# Patient Record
Sex: MALE | Race: OTHER | NOT HISPANIC OR LATINO | URBAN - METROPOLITAN AREA
[De-identification: names, ages, dates, MRNs, and addresses within clinical notes are randomized per-mention and may not be internally consistent; named-entity substitution may affect disease eponyms.]

---

## 2023-09-12 ENCOUNTER — EMERGENCY (EMERGENCY)
Facility: HOSPITAL | Age: 33
LOS: 1 days | Discharge: HOME | End: 2023-09-12
Attending: EMERGENCY MEDICINE
Payer: COMMERCIAL

## 2023-09-12 VITALS
RESPIRATION RATE: 16 BRPM | HEART RATE: 57 BPM | DIASTOLIC BLOOD PRESSURE: 88 MMHG | TEMPERATURE: 98 F | HEIGHT: 70 IN | OXYGEN SATURATION: 99 % | WEIGHT: 194.01 LBS | SYSTOLIC BLOOD PRESSURE: 143 MMHG

## 2023-09-12 PROCEDURE — 99282 EMERGENCY DEPT VISIT SF MDM: CPT

## 2023-09-12 PROCEDURE — 99283 EMERGENCY DEPT VISIT LOW MDM: CPT

## 2023-09-12 NOTE — ED PROVIDER NOTE - CARE PROVIDERS DIRECT ADDRESSES
,DirectAddress_Unknown ,DirectAddress_Unknown,[email or web address]

## 2023-09-12 NOTE — ED PROVIDER NOTE - ADDITIONAL NOTES AND INSTRUCTIONS:
PT was evaluated At [facility] ED and was found to have a condition that warranted time of to rest and heal from WORK/SCHOOL.   Blade Orantes PA-C

## 2023-09-12 NOTE — ED PROVIDER NOTE - NSFOLLOWUPINSTRUCTIONS_ED_ALL_ED_FT
Nasal congestion and rhinorrhea (runny nose) are extremely common problems that commonly occur together but occasionally occur alone.  The most common causes (see table ) are  Upper respiratory infections  Allergic reactions  Dry air may provoke congestion. Acute sinusitis is slightly less common, and a nasal foreign body is unusual (and occurs predominantly in children).  Patients who use topical decongestants for > 3 to 5 days often experience significant rebound congestion when the effects of the decongestants wear off,  causing them to continue using the decongestant in a vicious [place] of persistent, worsening congestion. This situation (rhinitis medicamentosa) may  persist for some time and may be misinterpreted as a continuation of the original problem rather than a consequence of treatment.  History  History of present illness should determine the nature of the discharge (eg, watery, mucoid, purulent, bloody) and whether discharge is chronic or  recurrent. If recurrent, any relation to patient location, season, and exposure to potential triggering allergens (numerous) should be determined. A  unilateral, clear, watery discharge, particularly after head trauma, can indicate a cerebrospinal fluid (CSF) leak. CSF discharge can also occur  spontaneously in women who are in their 40s and have obesity, secondary to idiopathic intracranial hypertension.  Review of systems should seek symptoms of possible causes, including fever and facial pain (sinusitis); watery, itchy eyes (allergies); and sore throat,  malaise, fever, and cough (viral upper respiratory infection [URI]).  Past medical history should seek known allergies and existence of diabetes or immunocompromise. Drug (prescription, over-the-counter, illicit) history  should include asking specifically about topical decongestant use.  Some Causes of Nasal Congestion and Rhinorrhea  Etiology of Nasal Congestion and Rhinorrhea

## 2023-09-12 NOTE — ED PROVIDER NOTE - PROVIDER TOKENS
PROVIDER:[TOKEN:[[id number]]] PROVIDER:[TOKEN:[[id number]]],PROVIDER:[TOKEN:[[id number]]]

## 2023-09-12 NOTE — ED PROVIDER NOTE - NEUROLOGICAL, MLM
Heating PAD 20 MIN AT A TIME 3-4 TIMES PER DAY.  IBUPROFEN AS NEEDED. MODERATE PHYSICAL ACTIVITY AS TOLERATED.   
Alert and oriented, no focal deficits, no motor or sensory deficits.

## 2023-09-12 NOTE — ED PROVIDER NOTE - CLINICAL SUMMARY MEDICAL DECISION MAKING FREE TEXT BOX
PT with no SPMHx presents to the ED with complaint of clear nasal discharge that occurred JPTA. Pt states that he had a sudden onset of painless nasal discharge. Pt states that liquid that came out of his nose was clean and made a puddle on the floor. Pt states that is started spontaneously and ended spontaneously. Pt admits that he had a salt taste in the back of his mouth when this happened. Pt states that he had no HA, dizziness, trama, loss of vision or hearing with incidents. Pt dines fever, chills, trama, change in smell. On exam Pt with no acute findings. Pt states that he came to the ED due to being worried that  he had leaked CSF. Pt educated that he likely had mucus leaking not CSF due to it being spontaneous no secondary symptoms, no change in neuro status. Pt to be dc home with supportive care, follow up to ENT, educated about when to return to the ED if needed. PT verbalizes that he understands all instructions and results. Pt informed that ED is open and available 24/7 365 days a yr, encouraged to return to the ED if they have any change in condition, or feel the need for revaluation.

## 2023-09-12 NOTE — ED PROVIDER NOTE - PATIENT PORTAL LINK FT
You can access the FollowMyHealth Patient Portal offered by [facility] by registering at the following website: [email or web address]. By joining [facility]’s FollowMyHealth portal, you will also be able to view your health information using other applications (apps) compatible with our system.

## 2023-09-12 NOTE — ED ADULT TRIAGE NOTE - CHIEF COMPLAINT QUOTE
I had clear fluid coming from [place] nose on the train this [place]. denies any fever, N/V/D, injury or fall

## 2023-09-12 NOTE — ED PROVIDER NOTE - ATTENDING APP SHARED VISIT CONTRIBUTION OF CARE
pt with runny nose , clear. Worried that he is leaking csf after googling reasons for runny nose.  No recent or past head trauma.  pe as documented  agree w evaluation and mngt

## 2023-09-12 NOTE — ED PROVIDER NOTE - NS ED ROS FT
ROS: CONTUSIONAL: Denies fever, chills, fatigue, wt loss. HEAD: Denies trauma, HA, Dizziness. EYE: Denies Acute visual changes, diplopia. ENMT: nasal discharge  Denies change in hearing, tinnitus, epistaxis, difficulty swallowing, sore throat. CARDIO: Denies CP, palpitations, edema. RESP: Denies Cough, SOB , Diff breathing, hemoptysis. GI: Denies N/V, ABD pain, change in bowel movement. URINARY: Denies difficulty urinating, pelvic pain. MS:  Denies joint pain, back pain, weakness, decreased ROM, swelling. NEURO: Denies change in gait, seizures, loss of sensation, dizziness, confusion LOC.  PSY: NO SI/HI. SKIN: Denies Rash, bruising.

## 2023-09-12 NOTE — ED PROVIDER NOTE - CARE PROVIDER_API CALL
Cameron Jha  Otolaryngology  [address] [place] [address], [facility] [address]  [place] [address]  Phone: [phone]  Fax: [phone]  Follow Up Time:    Cameron Jha  Otolaryngology  [address] [place] [address], [address]  [place] [address]  Phone: [phone]  Fax: [phone]  Follow Up Time:     John Paul Botello  Otolaryngology  [address] [facility]  [place] [address]  Phone: [phone]  Fax: [phone]  Follow Up Time:

## 2023-09-12 NOTE — ED PROVIDER NOTE - OBJECTIVE STATEMENT
PT with no SPMHx presents to the ED with complaint of clear nasal discharge that occurred JPTA. Pt states that he had a sudden onset of painless nasal discharge. Pt states that liquid that came out of his nose was clean and made a puddle on the floor. Pt states that is started spontaneously and ended spontaneously. Pt admits that he had a salt taste in the back of his mouth when this happened. Pt states that he had no HA, dizziness, trama, loss of vision or hearing with incidents. Pt dines fever, chills, trama, change in smell.